# Patient Record
Sex: FEMALE | Race: WHITE | NOT HISPANIC OR LATINO | URBAN - NONMETROPOLITAN AREA
[De-identification: names, ages, dates, MRNs, and addresses within clinical notes are randomized per-mention and may not be internally consistent; named-entity substitution may affect disease eponyms.]

---

## 2019-07-09 NOTE — PATIENT DISCUSSION
Astigmatism / Presbyopia OU - Discussed diagnosis in detail with patient- New glasses Rx given today- Will have Bessy order CL RX OU if patient lies new trials then we can finalize and she may order - Continue to monitor- RTC 1 year completeDES  OU- Discussed diagnosis in detail with patient- Discussed signs and symptoms of progression- Recommend patient drinking plenty of water and starting Omega 3’s - Recommend Refresh or Systane  throughout the day- Consider Restasis or plugs in the future if no improvement- Continue to monitor

## 2020-09-01 NOTE — PATIENT DISCUSSION
Astigmatism / Presbyopia OU - Discussed diagnosis in detail with patient- New glasses and CL Rx given today- Continue to monitor- RTC 1 year completeDES  OU- Discussed diagnosis in detail with patient- Discussed signs and symptoms of progression- Recommend patient drinking plenty of water and starting Omega 3’s - Recommend Refresh or Systane  throughout the day- Consider Restasis or plugs in the future if no improvement- Continue to monitorCortical Cataracts OU- Discussed diagnosis in detail with patient- Discussed signs and symptoms of progression- Discussed UV protection- No treatment needed at this time - Continue to monitor

## 2022-04-06 ENCOUNTER — ESTABLISHED PATIENT (OUTPATIENT)
Dept: URBAN - NONMETROPOLITAN AREA CLINIC 1 | Facility: CLINIC | Age: 47
End: 2022-04-06

## 2022-04-06 DIAGNOSIS — H52.4: ICD-10-CM

## 2022-04-06 PROCEDURE — 92014 COMPRE OPH EXAM EST PT 1/>: CPT

## 2022-04-06 PROCEDURE — 92310 CONTACT LENS FITTING OU: CPT

## 2022-04-06 PROCEDURE — 92015 DETERMINE REFRACTIVE STATE: CPT

## 2022-04-06 ASSESSMENT — TONOMETRY
OS_IOP_MMHG: 16
OD_IOP_MMHG: 14

## 2022-04-06 ASSESSMENT — VISUAL ACUITY
OS_CC: 20/20
OD_CC: 20/20

## 2023-04-11 ENCOUNTER — ESTABLISHED PATIENT (OUTPATIENT)
Dept: URBAN - NONMETROPOLITAN AREA CLINIC 1 | Facility: CLINIC | Age: 48
End: 2023-04-11

## 2023-04-11 DIAGNOSIS — H52.4: ICD-10-CM

## 2023-04-11 PROCEDURE — 92310-E CONTACT LENS FITTING ESTABLISH PATIENT

## 2023-04-11 PROCEDURE — 92014 COMPRE OPH EXAM EST PT 1/>: CPT

## 2023-04-11 PROCEDURE — 92015 DETERMINE REFRACTIVE STATE: CPT

## 2023-04-11 ASSESSMENT — VISUAL ACUITY
OU_CC: 20/20
OS_CC: 20/20
OD_CC: 20/25

## 2023-04-11 ASSESSMENT — TONOMETRY
OS_IOP_MMHG: 14
OD_IOP_MMHG: 14

## 2024-04-16 ENCOUNTER — ESTABLISHED PATIENT (OUTPATIENT)
Dept: URBAN - NONMETROPOLITAN AREA CLINIC 1 | Facility: CLINIC | Age: 49
End: 2024-04-16

## 2024-04-16 DIAGNOSIS — H52.4: ICD-10-CM

## 2024-04-16 PROCEDURE — 92014 COMPRE OPH EXAM EST PT 1/>: CPT

## 2024-04-16 PROCEDURE — 92015 DETERMINE REFRACTIVE STATE: CPT

## 2024-04-16 PROCEDURE — 92310-E CONTACT LENS FITTING ESTABLISH PATIENT

## 2024-04-16 ASSESSMENT — TONOMETRY
OS_IOP_MMHG: 14
OD_IOP_MMHG: 14

## 2024-04-16 ASSESSMENT — VISUAL ACUITY
OD_CC: 20/20
OS_CC: 20/20

## 2025-04-22 ENCOUNTER — COMPREHENSIVE EXAM (OUTPATIENT)
Age: 50
End: 2025-04-22

## 2025-04-22 DIAGNOSIS — H52.4: ICD-10-CM

## 2025-04-22 PROCEDURE — 92014 COMPRE OPH EXAM EST PT 1/>: CPT

## 2025-04-22 PROCEDURE — 92015 DETERMINE REFRACTIVE STATE: CPT

## 2025-04-22 PROCEDURE — 92310-1 LEVEL 1 SOFT LENS UPDATE
